# Patient Record
Sex: FEMALE | Race: WHITE | Employment: FULL TIME | URBAN - METROPOLITAN AREA
[De-identification: names, ages, dates, MRNs, and addresses within clinical notes are randomized per-mention and may not be internally consistent; named-entity substitution may affect disease eponyms.]

---

## 2018-05-29 LAB
ANTIBODY SCREEN, EXTERNAL: NORMAL
HBSAG, EXTERNAL: NONREACTIVE
HIV, EXTERNAL: NONREACTIVE
RUBELLA, EXTERNAL: 1.89
T. PALLIDUM, EXTERNAL: NORMAL
TYPE, ABO & RH, EXTERNAL: NORMAL

## 2018-11-29 LAB — GRBS, EXTERNAL: NORMAL

## 2018-12-05 ENCOUNTER — HOSPITAL ENCOUNTER (EMERGENCY)
Age: 33
Discharge: HOME | End: 2018-12-05
Attending: OBSTETRICS & GYNECOLOGY | Admitting: OBSTETRICS & GYNECOLOGY
Payer: COMMERCIAL

## 2018-12-05 VITALS
DIASTOLIC BLOOD PRESSURE: 65 MMHG | HEART RATE: 76 BPM | BODY MASS INDEX: 34.75 KG/M2 | OXYGEN SATURATION: 99 % | WEIGHT: 177 LBS | TEMPERATURE: 98.1 F | SYSTOLIC BLOOD PRESSURE: 111 MMHG | HEIGHT: 60 IN

## 2018-12-05 LAB
ALBUMIN SERPL-MCNC: 2.3 G/DL (ref 3.5–5)
ALBUMIN/GLOB SERPL: 0.6 {RATIO} (ref 1.1–2.2)
ALP SERPL-CCNC: 162 U/L (ref 45–117)
ALT SERPL-CCNC: 17 U/L (ref 12–78)
ANION GAP SERPL CALC-SCNC: 9 MMOL/L (ref 5–15)
AST SERPL-CCNC: 21 U/L (ref 15–37)
BILIRUB SERPL-MCNC: 0.9 MG/DL (ref 0.2–1)
BUN SERPL-MCNC: 4 MG/DL (ref 6–20)
BUN/CREAT SERPL: 6 (ref 12–20)
CALCIUM SERPL-MCNC: 7.8 MG/DL (ref 8.5–10.1)
CHLORIDE SERPL-SCNC: 108 MMOL/L (ref 97–108)
CO2 SERPL-SCNC: 22 MMOL/L (ref 21–32)
CREAT SERPL-MCNC: 0.62 MG/DL (ref 0.55–1.02)
CREAT UR-MCNC: 269 MG/DL
ERYTHROCYTE [DISTWIDTH] IN BLOOD BY AUTOMATED COUNT: 15 % (ref 11.5–14.5)
GLOBULIN SER CALC-MCNC: 3.6 G/DL (ref 2–4)
GLUCOSE SERPL-MCNC: 113 MG/DL (ref 65–100)
HCT VFR BLD AUTO: 33 % (ref 35–47)
HGB BLD-MCNC: 10.4 G/DL (ref 11.5–16)
MCH RBC QN AUTO: 26.7 PG (ref 26–34)
MCHC RBC AUTO-ENTMCNC: 31.5 G/DL (ref 30–36.5)
MCV RBC AUTO: 84.8 FL (ref 80–99)
NRBC # BLD: 0 K/UL (ref 0–0.01)
NRBC BLD-RTO: 0 PER 100 WBC
PLATELET # BLD AUTO: 208 K/UL (ref 150–400)
PMV BLD AUTO: 12.4 FL (ref 8.9–12.9)
POTASSIUM SERPL-SCNC: 3.3 MMOL/L (ref 3.5–5.1)
PROT SERPL-MCNC: 5.9 G/DL (ref 6.4–8.2)
PROT UR-MCNC: 51 MG/DL (ref 0–11.9)
PROT/CREAT UR-RTO: 0.2
RBC # BLD AUTO: 3.89 M/UL (ref 3.8–5.2)
SODIUM SERPL-SCNC: 139 MMOL/L (ref 136–145)
URATE SERPL-MCNC: 5.4 MG/DL (ref 2.6–6)
WBC # BLD AUTO: 13 K/UL (ref 3.6–11)

## 2018-12-05 PROCEDURE — 59025 FETAL NON-STRESS TEST: CPT

## 2018-12-05 PROCEDURE — 80053 COMPREHEN METABOLIC PANEL: CPT

## 2018-12-05 PROCEDURE — 99284 EMERGENCY DEPT VISIT MOD MDM: CPT

## 2018-12-05 PROCEDURE — 85027 COMPLETE CBC AUTOMATED: CPT

## 2018-12-05 PROCEDURE — 84550 ASSAY OF BLOOD/URIC ACID: CPT

## 2018-12-05 PROCEDURE — 36415 COLL VENOUS BLD VENIPUNCTURE: CPT

## 2018-12-05 PROCEDURE — 84156 ASSAY OF PROTEIN URINE: CPT

## 2018-12-05 RX ORDER — SODIUM CHLORIDE 0.9 % (FLUSH) 0.9 %
SYRINGE (ML) INJECTION
Status: DISCONTINUED
Start: 2018-12-05 | End: 2018-12-05 | Stop reason: HOSPADM

## 2018-12-06 NOTE — PROGRESS NOTES
Discharge instructions reviewed with pt. Will keep appointment as scheduled for tomorrow. Copy of instructions provided

## 2018-12-06 NOTE — H&P
Addendum: 
 
Plt 208 Cr 0.62 ALT 17 AST 21 Uric acid 5.4 Protein/creatinine ratio 0.2 Patient reassured. Reviewed labs with pt and she will f/u with Dr. Heraclio Mancilla tomorrow as scheduled. Labor precautions/fetal movement as well as pre-eclampsia precautions reviewed. All questions answered.  
 
Diana Leslie, DO

## 2018-12-06 NOTE — H&P
History & Physical 
 
Name: Eduardo Gutiérrez MRN: [id number]  SSN: [id number] YOB: 1985  Age: 34 y.o. Sex: female Subjective:  
 
Estimated Date of Delivery: 18 OB History  Para Term  AB Living 1 SAB TAB Ectopic Molar Multiple Live Births # Outcome Date GA Lbr Joshua/2nd Weight Sex Delivery Anes PTL Lv  
1 Current Ms. Johnathon Tomas is a 29yo [id number] who is being evaluated with pregnancy at 36w6d. Patient presents to L&D after calling her physician's office with an elevated bp which she took of 150/90. Patient has been having headaches for approximately one month and has been having scotomata for a few weeks as well. She has been having lower extremity swelling as well which does not seem to be a new issue for her. She has been reporting some carpal tunnel syndrome symptoms as well. She denies any ruq pain. She reports good fetal movement and denies leaking fluid or vaginal bleeding. She has not been drinking a lot of water. She has also been working a lot, working 2 jobs. She does have hypothyroidism and recently has been having issues with heartburn but denies any other significant issues with this pregnancy. She denies any chest pain or shortness of breath. Please see prenatal records for details. PObHx: EAB x 2 PGynHx: h/o abnormal pap smear  with normal f/u PMHx: hypothyroidism; PCOS 
PSHx: ultrasound guided cyst aspiration Meds: synthroid, PNV, zantac, tylenol All: NKDA SocHx: ; works as a dialysis technician No past medical history on file. No past surgical history on file. Social History Occupational History  Occupation: dialysis tech Tobacco Use  Smoking status: Never Smoker Substance and Sexual Activity  Alcohol use: No  
  Alcohol/week: 0.0 oz  Drug use: No  
 Sexual activity: Yes  
  Partners: Male Family History Problem Relation Age of Onset  Hypertension Mother No Known Allergies Prior to Admission medications Medication Sig Start Date End Date Taking? Authorizing Provider  
folic acid 198 mcg tablet Take 1 Tab by mouth daily. 6/2/16   Brittany Mac MD  
  
 
Review of Systems: A comprehensive review of systems was negative except for that written in the HPI. Objective:  
 
Vitals: 
Vitals:  
 12/05/18 1856 12/05/18 1915 12/05/18 1933 BP: 132/85 129/76 111/65 Pulse: 75 76 Temp: 98.1 °F (36.7 °C) SpO2: 99% 99% Weight: 80.3 kg (177 lb) Height: 5' (1.524 m) Physical Exam: 
Patient without distress. Heart: S1S2 present Lung: normal respiratory effort Abdomen: soft, gravid, nontender Cervical Exam: deferred Lower Extremities: no calf tenderness;1+DTR UE & LE 
Membranes:  Intact Fetal Heart Rate: Reactive; 120-130's moderate variability, +accels, no decels [place]: nonw Prenatal Labs:  
O positive Rubella Immune TPA negative HBsAg negative HIV nonreactive GC/chlamydia negative 
glucola 122 GBS negative Lab Results Component Value Date/Time  
 Rubella, External 1.89 05/29/2018 GrBStrep, External neg 11/29/2018 HBsAg, External nonreactive 05/29/2018 HIV, External nonreactive 05/29/2018 ABO,Rh o positive 05/29/2018 Assessment/Plan: Active Problems: * No active hospital problems. * 
  
 
32yo [id number] @ 36w6d here for bp evaluation -bp's have been normal on L&D 
-will check CBC, CMP, uric acid, protein/creatinine ratio 
-discussed wrist braces for carpal tunnel 
-encouraged pt to increase water intake as dehydration may be contributing to headaches 
-reactive FHT 
-all questions answered 
-will discuss plan when labs are back Signed By:  Hernandez Phelan DO   
 December 5, 2018

## 2018-12-06 NOTE — PROGRESS NOTES
Patient arrives ambulatory to L&D triage with  after being told to come in due to high BPs and swelling in legs by Dr. Yasmani Quick. Patient is  and has anemia. Patient voided and provided urine sample; blood drawn at this time. All samples sent to lab at this time. Patient on monitor for NST.

## 2018-12-06 NOTE — DISCHARGE INSTRUCTIONS
Weeks 34 to 36 of Your Pregnancy: Care Instructions  Your Care Instructions    By now, your baby and your belly have grown quite large. It is almost time to give birth. A full-term pregnancy can deliver between 37 and 42 weeks. Your baby's lungs are almost ready to breathe air. The bones in your baby's head are now firm enough to protect it, but soft enough to move down through the birth canal.  You may feel excited, happy, anxious, or scared. You may wonder how you will know if you are in labor or what to expect during labor. Try to be flexible in your expectations of the birth. Because each birth is different, there is no way to know exactly what childbirth will be like for you. This care sheet will help you know what to expect and how to prepare. This may make your childbirth easier. If you haven't already had the Tdap shot during this pregnancy, talk to your doctor about getting it. It will help protect your  against pertussis infection. In the 36th week, most women have a test for group B streptococcus (GBS). GBS is a common bacteria that can live in the vagina and rectum. It can make your baby sick after birth. If you test positive, you will get antibiotics during labor. The medicine will keep your baby from getting the bacteria. Follow-up care is a key part of your treatment and safety. Be sure to make and go to all appointments, and call your doctor if you are having problems. It's also a good idea to know your test results and keep a list of the medicines you take. How can you care for yourself at home? Learn about pain relief choices  · Pain is different for every woman. Talk with your doctor about your feelings about pain. · You can choose from several types of pain relief. These include medicine or breathing techniques, as well as comfort measures. You can use more than one option. · If you choose to have pain medicine during labor, talk to your doctor about your options.  Some medicines lower anxiety and help with some of the pain. Others make your lower body numb so that you won't feel pain. · Be sure to tell your doctor about your pain medicine choice before you start labor or very early in your labor. You may be able to change your mind as labor progresses. · Rarely, a woman is put to sleep by medicine given through a mask or an IV. Labor and delivery  · The first stage of labor has three parts: early, active, and transition. ? Most women have early labor at home. You can stay busy or rest, eat light snacks, drink clear fluids, and start counting contractions. ? When talking during a contraction gets hard, you may be moving to active labor. During active labor, you should head for the hospital if you are not there already. ? You are in active labor when contractions come every 3 to 4 minutes and last about 60 seconds. Your cervix is opening more rapidly. ? If your water breaks, contractions will come faster and stronger. ? During transition, your cervix is stretching, and contractions are coming more rapidly. ? You may want to push, but your cervix might not be ready. Your doctor will tell you when to push. · The second stage starts when your cervix is completely opened and you are ready to push. ? Contractions are very strong to push the baby down the birth canal.  ? You will feel the urge to push. You may feel like you need to have a bowel movement. ? You may be coached to push with contractions. These contractions will be very strong, but you will not have them as often. You can get a little rest between contractions. ? You may be emotional and irritable. You may not be aware of what is going on around you.  ? One last push, and your baby is born. · The third stage is when a few more contractions push out the placenta. This may take 30 minutes or less. · The fourth stage is the welcome recovery. You may feel overwhelmed with emotions and exhausted but alert.  This is a good time to start breastfeeding. Where can you learn more? Go to http://jadiel-patt.info/. Enter C597 in the search box to learn more about \"Weeks 34 to 36 of Your Pregnancy: Care Instructions. \"  Current as of: November 21, 2017  Content Version: 11.8  © 2403-7924 [employer]wise, [employer]. Care instructions adapted under license by [employer] (which disclaims liability or warranty for this information). If you have questions about a medical condition or this instruction, always ask your healthcare professional. [address] any warranty or liability for your use of this information. Keep scheduled appointment.     Drink lot of water

## 2018-12-16 ENCOUNTER — HOSPITAL ENCOUNTER (INPATIENT)
Age: 33
LOS: 2 days | Discharge: HOME | End: 2018-12-18
Attending: OBSTETRICS & GYNECOLOGY | Admitting: OBSTETRICS & GYNECOLOGY
Payer: COMMERCIAL

## 2018-12-16 DIAGNOSIS — Z34.90 PREGNANCY, UNSPECIFIED GESTATIONAL AGE: Primary | ICD-10-CM

## 2018-12-16 LAB
BASOPHILS # BLD: 0 K/UL (ref 0–0.1)
BASOPHILS NFR BLD: 0 % (ref 0–1)
DIFFERENTIAL METHOD BLD: ABNORMAL
EOSINOPHIL # BLD: 0 K/UL (ref 0–0.4)
EOSINOPHIL NFR BLD: 0 % (ref 0–7)
ERYTHROCYTE [DISTWIDTH] IN BLOOD BY AUTOMATED COUNT: 15.7 % (ref 11.5–14.5)
HCT VFR BLD AUTO: 36.4 % (ref 35–47)
HGB BLD-MCNC: 11.6 G/DL (ref 11.5–16)
IMM GRANULOCYTES # BLD: 0.1 K/UL (ref 0–0.04)
IMM GRANULOCYTES NFR BLD AUTO: 1 % (ref 0–0.5)
LYMPHOCYTES # BLD: 1.3 K/UL (ref 0.8–3.5)
LYMPHOCYTES NFR BLD: 8 % (ref 12–49)
MCH RBC QN AUTO: 26.7 PG (ref 26–34)
MCHC RBC AUTO-ENTMCNC: 31.9 G/DL (ref 30–36.5)
MCV RBC AUTO: 83.9 FL (ref 80–99)
MONOCYTES # BLD: 0.7 K/UL (ref 0–1)
MONOCYTES NFR BLD: 4 % (ref 5–13)
NEUTS SEG # BLD: 14.5 K/UL (ref 1.8–8)
NEUTS SEG NFR BLD: 87 % (ref 32–75)
NRBC # BLD: 0 K/UL (ref 0–0.01)
NRBC BLD-RTO: 0 PER 100 WBC
PLATELET # BLD AUTO: 295 K/UL (ref 150–400)
PMV BLD AUTO: 11.8 FL (ref 8.9–12.9)
RBC # BLD AUTO: 4.34 M/UL (ref 3.8–5.2)
WBC # BLD AUTO: 16.6 K/UL (ref 3.6–11)

## 2018-12-16 PROCEDURE — 65410000002 HC RM PRIVATE OB

## 2018-12-16 PROCEDURE — 75410000000 HC DELIVERY VAGINAL/SINGLE

## 2018-12-16 PROCEDURE — 85025 COMPLETE CBC W/AUTO DIFF WBC: CPT

## 2018-12-16 PROCEDURE — 75410000002 HC LABOR FEE PER 1 HR

## 2018-12-16 PROCEDURE — 74011250636 HC RX REV CODE- 250/636: Performed by: OBSTETRICS & GYNECOLOGY

## 2018-12-16 PROCEDURE — 36415 COLL VENOUS BLD VENIPUNCTURE: CPT

## 2018-12-16 PROCEDURE — 74011250637 HC RX REV CODE- 250/637: Performed by: OBSTETRICS & GYNECOLOGY

## 2018-12-16 PROCEDURE — 4A0HXCZ MEASUREMENT OF PRODUCTS OF CONCEPTION, CARDIAC RATE, EXTERNAL APPROACH: ICD-10-PCS | Performed by: OBSTETRICS & GYNECOLOGY

## 2018-12-16 PROCEDURE — 75410000003 HC RECOV DEL/VAG/CSECN EA 0.5 HR

## 2018-12-16 PROCEDURE — 0KQM0ZZ REPAIR PERINEUM MUSCLE, OPEN APPROACH: ICD-10-PCS | Performed by: OBSTETRICS & GYNECOLOGY

## 2018-12-16 PROCEDURE — 74011250636 HC RX REV CODE- 250/636

## 2018-12-16 RX ORDER — DIPHENHYDRAMINE HCL 25 MG
25 CAPSULE ORAL
Status: DISCONTINUED | OUTPATIENT
Start: 2018-12-16 | End: 2018-12-18 | Stop reason: HOSPADM

## 2018-12-16 RX ORDER — SODIUM CHLORIDE 0.9 % (FLUSH) 0.9 %
5-10 SYRINGE (ML) INJECTION EVERY 8 HOURS
Status: DISCONTINUED | OUTPATIENT
Start: 2018-12-16 | End: 2018-12-16

## 2018-12-16 RX ORDER — LIDOCAINE HYDROCHLORIDE 10 MG/ML
INJECTION, SOLUTION EPIDURAL; INFILTRATION; INTRACAUDAL; PERINEURAL
Status: DISPENSED
Start: 2018-12-16 | End: 2018-12-17

## 2018-12-16 RX ORDER — HYDROCORTISONE ACETATE PRAMOXINE HCL 2.5; 1 G/100G; G/100G
CREAM TOPICAL AS NEEDED
Status: DISCONTINUED | OUTPATIENT
Start: 2018-12-16 | End: 2018-12-18 | Stop reason: HOSPADM

## 2018-12-16 RX ORDER — OXYTOCIN 10 [USP'U]/ML
INJECTION, SOLUTION INTRAMUSCULAR; INTRAVENOUS
Status: COMPLETED
Start: 2018-12-16 | End: 2018-12-16

## 2018-12-16 RX ORDER — ZOLPIDEM TARTRATE 5 MG/1
5 TABLET ORAL
Status: DISCONTINUED | OUTPATIENT
Start: 2018-12-16 | End: 2018-12-18 | Stop reason: HOSPADM

## 2018-12-16 RX ORDER — OXYTOCIN/RINGER'S LACTATE 20/1000 ML
PLASTIC BAG, INJECTION (ML) INTRAVENOUS
Status: COMPLETED
Start: 2018-12-16 | End: 2018-12-16

## 2018-12-16 RX ORDER — DOCUSATE SODIUM 100 MG/1
100 CAPSULE, LIQUID FILLED ORAL
Status: DISCONTINUED | OUTPATIENT
Start: 2018-12-16 | End: 2018-12-18 | Stop reason: HOSPADM

## 2018-12-16 RX ORDER — IBUPROFEN 400 MG/1
800 TABLET ORAL
Status: DISCONTINUED | OUTPATIENT
Start: 2018-12-16 | End: 2018-12-18 | Stop reason: HOSPADM

## 2018-12-16 RX ORDER — NALOXONE HYDROCHLORIDE 0.4 MG/ML
0.4 INJECTION, SOLUTION INTRAMUSCULAR; INTRAVENOUS; SUBCUTANEOUS AS NEEDED
Status: DISCONTINUED | OUTPATIENT
Start: 2018-12-16 | End: 2018-12-16

## 2018-12-16 RX ORDER — LANOLIN ALCOHOL/MO/W.PET/CERES
325 CREAM (GRAM) TOPICAL
COMMUNITY
End: 2019-08-28 | Stop reason: ALTCHOICE

## 2018-12-16 RX ORDER — ACETAMINOPHEN 325 MG/1
650 TABLET ORAL
Status: DISCONTINUED | OUTPATIENT
Start: 2018-12-16 | End: 2018-12-18 | Stop reason: HOSPADM

## 2018-12-16 RX ORDER — LEVOTHYROXINE SODIUM 50 UG/1
75 TABLET ORAL
COMMUNITY
End: 2019-08-28 | Stop reason: DRUGHIGH

## 2018-12-16 RX ORDER — ONDANSETRON 2 MG/ML
4 INJECTION INTRAMUSCULAR; INTRAVENOUS
Status: DISCONTINUED | OUTPATIENT
Start: 2018-12-16 | End: 2018-12-16

## 2018-12-16 RX ORDER — SODIUM CHLORIDE 0.9 % (FLUSH) 0.9 %
5-10 SYRINGE (ML) INJECTION AS NEEDED
Status: DISCONTINUED | OUTPATIENT
Start: 2018-12-16 | End: 2018-12-16

## 2018-12-16 RX ORDER — OXYTOCIN/RINGER'S LACTATE 20/1000 ML
125-500 PLASTIC BAG, INJECTION (ML) INTRAVENOUS ONCE
Status: ACTIVE | OUTPATIENT
Start: 2018-12-16 | End: 2018-12-17

## 2018-12-16 RX ORDER — NALBUPHINE HYDROCHLORIDE 10 MG/ML
10 INJECTION, SOLUTION INTRAMUSCULAR; INTRAVENOUS; SUBCUTANEOUS
Status: DISCONTINUED | OUTPATIENT
Start: 2018-12-16 | End: 2018-12-16

## 2018-12-16 RX ORDER — ONDANSETRON 2 MG/ML
4 INJECTION INTRAMUSCULAR; INTRAVENOUS
Status: DISCONTINUED | OUTPATIENT
Start: 2018-12-16 | End: 2018-12-18 | Stop reason: HOSPADM

## 2018-12-16 RX ORDER — NALOXONE HYDROCHLORIDE 0.4 MG/ML
0.4 INJECTION, SOLUTION INTRAMUSCULAR; INTRAVENOUS; SUBCUTANEOUS AS NEEDED
Status: DISCONTINUED | OUTPATIENT
Start: 2018-12-16 | End: 2018-12-18 | Stop reason: HOSPADM

## 2018-12-16 RX ORDER — SODIUM CHLORIDE, SODIUM LACTATE, POTASSIUM CHLORIDE, CALCIUM CHLORIDE 600; 310; 30; 20 MG/100ML; MG/100ML; MG/100ML; MG/100ML
125 INJECTION, SOLUTION INTRAVENOUS CONTINUOUS
Status: DISCONTINUED | OUTPATIENT
Start: 2018-12-16 | End: 2018-12-16

## 2018-12-16 RX ADMIN — Medication 20000 MILLI-UNITS: at 15:30

## 2018-12-16 RX ADMIN — ONDANSETRON 4 MG: 2 INJECTION INTRAMUSCULAR; INTRAVENOUS at 12:22

## 2018-12-16 RX ADMIN — OXYTOCIN 10 UNITS: 10 INJECTION, SOLUTION INTRAMUSCULAR; INTRAVENOUS at 15:30

## 2018-12-16 RX ADMIN — SODIUM CHLORIDE, SODIUM LACTATE, POTASSIUM CHLORIDE, AND CALCIUM CHLORIDE 125 ML/HR: 600; 310; 30; 20 INJECTION, SOLUTION INTRAVENOUS at 12:08

## 2018-12-16 RX ADMIN — NALBUPHINE HYDROCHLORIDE 10 MG: 10 INJECTION, SOLUTION INTRAMUSCULAR; INTRAVENOUS; SUBCUTANEOUS at 12:22

## 2018-12-16 RX ADMIN — IBUPROFEN 800 MG: 400 TABLET ORAL at 20:48

## 2018-12-16 NOTE — PROGRESS NOTES
Labor Progress Note  Patient seen, fetal heart rate and contraction pattern evaluated, patient examined. Patient received nubain with some relief and now using nitrous.   Patient Vitals for the past 1 hrs:   BP Pulse SpO2   18 1413   94 %   18 1409   97 %   18 1408   92 %   18 1404   100 %   18 1402   94 %   18 1359   97 %   18 1355   93 %   18 1354   93 %   18 1350   (!) 88 %   18 1349   96 %   18 1344   (!) 86 %   18 1343 (!) 151/108 77 94 %   18 1339   (!) 80 %   18 1337   (!) 82 %   18 1334   (!) 78 %   18 1331   90 %   18 1329   (!) 86 %       Physical Exam:  Cervical Exam:  9/100/0  Uterine Activity: difficulty tracing  Fetal Heart Rate: Reactive; 110's moderate variability, +accels, no decels    Assessment/Plan:  34yo  @ 38w3d in labor  -cont exp mgmt  -reactive FHT

## 2018-12-16 NOTE — PROGRESS NOTES
36  arrived from home with c/o contractions and SROM. EFMx2 applied fetal heart tones found in left lower abdomen. [address] [facility] [address] Castillo Screen at bedside to assess pt.    1148 pt transferred from triage to labor room 3169.    1410 Nitrous per pt request for contraction pain 10/10.    1735 Pt transferred from recovery care to postpartum care. 193 Bedside report given to WILLIAM Hazel RN and care turned over.

## 2018-12-16 NOTE — PROCEDURES
Delivery Note    Obstetrician:  Mare Black DO    Assistant: none    Pre-Delivery Diagnosis: Term pregnancy    Post-Delivery Diagnosis: Living  male infant    Intrapartum Event: None    Procedure: Spontaneous vaginal delivery    Epidural: NO    Monitor:  Fetal Heart Tones - External and Uterine Contractions - External    Indications for instrumental delivery: none    Estimated Blood Loss: 300cc    Episiotomy: none    Laceration(s):  2nd degree    Laceration(s) repair: YES with 3-0 and 2-0 vicryl    Presentation: Cephalic    Fetal Description: weldon    Fetal Position: Right Occiput Anterior    Birth Weight: 7lbs 2oz    Birth Length: 21 inches    Apgar - One Minute: 9    Apgar - Five Minutes: 9    Umbilical Cord: Nuchal Cord x  1, loose & reduced at the perineum and true knot    Specimens: none           Complications:  none           Cord Blood Results:   Information for the patient's :  James Bryan [[id number]]   No results found for: PCTABR, PCTDIG, BILI, ABORH    Prenatal Labs:     Lab Results   Component Value Date/Time    HBsAg, External nonreactive 2018    HIV, External nonreactive 2018    Rubella, External 1.89 2018    GrBStrep, External neg 2018        Attending Attestation: I performed the procedure    Patient is a 34yo G3 now P1 who presented earlier today with complaints of contractions which started at 0700 and SROM which occurred around 0815. Patient was found to be 5cm upon arrival to L&D. She received nubain x 1 and then used nitrous. She then quickly progressed to fully dilated and pushed approx 20 minutes.  over intact perineum of viable male infant, YASH at 46; there was a nuchal x 1 which was loose and reduced at the perineum; left shoulder delivered anterior without difficulty as did the rest of the body. Baby was placed on mom's abdomen and delayed cord clamping was performed.   This provider was then called out of the room for a precipitous delivery in another room. When this provider returned, the cord had been clamped and cut by the RN and FOB and the baby was taken to the warmer. Placenta then delivered spontaneous and intact. After delivery there was a second degree laceration which was noted. The sphincter muscle was reinforced with one figure of eight stitch using 2-0 vicryl and then rest of the laceration was repaired with 3-0 and 2-0 vicryl in a routine fashion. A rectal exam was done at the end of the exam and found to be intact. Mom and baby doing well.       Signed By:  Rachael Mccarty DO     December 16, 2018

## 2018-12-16 NOTE — H&P
History & Physical    Name: Cuca Salomon MRN: [id number]  SSN: [id number]    YOB: 1985  Age: [age] [place] y.o. Sex: female        Subjective:     Estimated Date of Delivery: 18  OB History    Para Term  AB Living   1             SAB TAB Ectopic Molar Multiple Live Births                    # Outcome Date GA Lbr Joshua/2nd Weight Sex Delivery Anes PTL Lv   1 Current                   Ms. Volodymyr Leiva is admitted with pregnancy at 38w3d for active labor. Patient reports she woke up this morning and started having contractions around 0700 which have been getting more intense. She also reports leaking fluid at 0815 and wore depends in which she said were changed a couple of times at home. Patient was followed for increased swelling and occasional elevated bp's over the past couple of weeks. Fetus also had a choroid plexus cyst and pyelectasis, both of which resolved. Patient is hypothyroid. Patient plans natural childbirth. Please see prenatal records for details. Past Medical History:   Diagnosis Date    Thyroid activity decreased      History reviewed. No pertinent surgical history. Social History     Occupational History    Occupation: dialysis tech   Tobacco Use    Smoking status: Never Smoker    Smokeless tobacco: Never Used   Substance and Sexual Activity    Alcohol use: No     Alcohol/week: 0.0 oz    Drug use: No    Sexual activity: Yes     Partners: Male     Family History   Problem Relation Age of Onset    Hypertension Mother        No Known Allergies  Prior to Admission medications    Medication Sig Start Date End Date Taking? Authorizing Provider   levothyroxine (SYNTHROID) 50 mcg tablet Take 50 mcg by mouth Daily (before breakfast). Yes Provider, Historical   PNV#16-Iron Fum & PS-FA-OM-3 35-1-200 mg cap Take 1 Tab by mouth. Yes Provider, Historical   ferrous sulfate (IRON) 325 mg (65 mg iron) tablet Take 325 mg by mouth Daily (before breakfast).    Yes Provider, Historical        Review of Systems: A comprehensive review of systems was negative except for that written in the HPI. Objective:     Vitals:  Vitals:    12/16/18 1144 12/16/18 1145 12/16/18 1148 12/16/18 1152   BP:       Pulse:       Resp:       Temp:       SpO2: 99% 99% 99% 99%   Weight:       Height:            Physical Exam:  Patient without distress but uncomfortable with ctx. Heart: s1s2  Lung: normal respiratory effort  Abdomen: soft, gravid, nontender  Perineum: blood absent, thighs appear slightly wet  Cervical Exam: 5/100/0  Lower Extremities: no calf tenderness  Membranes:  Spontaneous Rupture of Membranes; Amniotic Fluid: clear fluid  Fetal Heart Rate: Reactive; 120's moderate variability, +accels, no decels    Prenatal Labs:   Lab Results   Component Value Date/Time    Rubella, External 1.89 05/29/2018    GrBStrep, External neg 11/29/2018    HBsAg, External nonreactive 05/29/2018    HIV, External nonreactive 05/29/2018    ABO,Rh o positive 05/29/2018         Assessment/Plan:     Active Problems:    * No active hospital problems.  *       27yo [id number] @ 38w3d in labor  -admit to L&D  -CBC  -clears if desired  -may ambulate if desired  -IV pain meds and/or epidural if desired  -GBS negative  -all questions answered  -reactive FHT  -exp mgmt    Signed By:  Rachael Mccarty DO     December 16, 2018

## 2018-12-17 PROCEDURE — 99283 EMERGENCY DEPT VISIT LOW MDM: CPT

## 2018-12-17 PROCEDURE — 65410000002 HC RM PRIVATE OB

## 2018-12-17 PROCEDURE — 74011250637 HC RX REV CODE- 250/637: Performed by: OBSTETRICS & GYNECOLOGY

## 2018-12-17 RX ORDER — LEVOTHYROXINE SODIUM 50 UG/1
50 TABLET ORAL
Status: DISCONTINUED | OUTPATIENT
Start: 2018-12-17 | End: 2018-12-18 | Stop reason: HOSPADM

## 2018-12-17 RX ADMIN — IBUPROFEN 800 MG: 400 TABLET ORAL at 04:52

## 2018-12-17 RX ADMIN — IBUPROFEN 800 MG: 400 TABLET ORAL at 12:44

## 2018-12-17 RX ADMIN — IBUPROFEN 800 MG: 400 TABLET ORAL at 20:51

## 2018-12-17 RX ADMIN — LEVOTHYROXINE SODIUM 50 MCG: 50 TABLET ORAL at 09:28

## 2018-12-17 NOTE — ROUTINE PROCESS
Shift change report given to STEPHEN ScottRN (oncoming nurse) by WILLIAM Wilson RNC-MNN (offgoing nurse). Report included the following information SBAR, Procedure Summary, Intake/Output, MAR and Recent Results.

## 2018-12-17 NOTE — ROUTINE PROCESS
In chart as Director to review triage documentation. Documented in Triage Acuity tool per RN and MD documentation.

## 2018-12-17 NOTE — PROGRESS NOTES
TRANSFER - IN REPORT:    Verbal report received from MARY Wallace(name) on Antonio Notice  being received from L&D(unit) for routine progression of care      Report consisted of patients Situation, Background, Assessment and   Recommendations(SBAR). Information from the following report(s) SBAR, Procedure Summary, Intake/Output, MAR and Recent Results was reviewed with the receiving nurse. Opportunity for questions and clarification was provided. Assessment completed upon patients arrival to unit and care assumed.

## 2018-12-17 NOTE — PROGRESS NOTES
Post-Partum Day Number 1 Progress Note    Tracie Mejia     Assessment: Doing well, post partum day 1    Plan:  1. Continue routine postpartum and perineal care as well as maternal education. 2. The risks and benefits of the circumcision  procedure and anesthesia including: bleeding, infection, variability of cosmetic results were discussed at length with the mother. She is aware that future repeat procedures may be necessary. She gives informed consent to proceed as noted and her questions are answered. Information for the patient's :  Jerica England [[id number]]   Vaginal, Spontaneous   Patient doing well without significant complaint. Voiding without difficulty, normal lochia. Vitals:  Visit Vitals  /68 (BP 1 Location: Right arm, BP Patient Position: At rest)   Pulse 73   Temp 97.7 °F (36.5 °C)   Resp 17   Ht 5' (1.524 m)   Wt 85.3 kg (188 lb)       Breastfeeding? Unknown   BMI 36.72 kg/m²     Temp (24hrs), Av.2 °F (36.8 °C), Min:97.7 °F (36.5 °C), Max:99.1 °F (37.3 °C)        Exam:   Patient without distress. Abdomen soft, fundus firm, nontender                Perineum with normal lochia noted. Lower extremities are negative for swelling, cords or tenderness.     Labs:     Lab Results   Component Value Date/Time    WBC 16.6 (H) 2018 12:15 PM    WBC 13.0 (H) 2018 07:00 PM    HGB 11.6 2018 12:15 PM    HGB 10.4 (L) 2018 07:00 PM    HCT 36.4 2018 12:15 PM    HCT 33.0 (L) 2018 07:00 PM    PLATELET 024  12:15 PM    PLATELET 324  07:00 PM       Recent Results (from the past 24 hour(s))   CBC WITH AUTOMATED DIFF    Collection Time: 18 12:15 PM   Result Value Ref Range    WBC 16.6 (H) 3.6 - 11.0 K/uL    RBC 4.34 3.80 - 5.20 M/uL    HGB 11.6 11.5 - 16.0 g/dL    HCT 36.4 35.0 - 47.0 %    MCV 83.9 80.0 - 99.0 FL    MCH 26.7 26.0 - 34.0 PG    MCHC 31.9 30.0 - 36.5 g/dL    RDW 15.7 (H) 11.5 - 14.5 % PLATELET 925 503 - 192 K/uL    MPV 11.8 8.9 - 12.9 FL    NRBC 0.0 0  WBC    ABSOLUTE NRBC 0.00 0.00 - 0.01 K/uL    NEUTROPHILS 87 (H) 32 - 75 %    LYMPHOCYTES 8 (L) 12 - 49 %    MONOCYTES 4 (L) 5 - 13 %    EOSINOPHILS 0 0 - 7 %    BASOPHILS 0 0 - 1 %    IMMATURE GRANULOCYTES 1 (H) 0.0 - 0.5 %    ABS. NEUTROPHILS 14.5 (H) 1.8 - 8.0 K/UL    ABS. LYMPHOCYTES 1.3 0.8 - 3.5 K/UL    ABS. MONOCYTES 0.7 0.0 - 1.0 K/UL    ABS. EOSINOPHILS 0.0 0.0 - 0.4 K/UL    ABS. BASOPHILS 0.0 0.0 - 0.1 K/UL    ABS. IMM.  GRANS. 0.1 (H) 0.00 - 0.04 K/UL    DF AUTOMATED

## 2018-12-17 NOTE — PROGRESS NOTES
Up with nurse at side to ambulate to commode. Voided 200cc without difficulty. Oriented to self filomena care. Gait even and steady. Denies light headedness and dizziness. To shower. arely well. 2145: moved to room 3310 via w/ch with all personal belongings, infant, and significant other. No c/o. Oriented to room, call system, tv control, telephone. Questions answered. arely well. 0010: up with nurse at side to ambulate to commode and void 400cc without difficulty. Ice pack replaced. Gait even and steady. Denies light headedness and dizziness. Returned to bed without difficulty. Informed may get OOB without nurse present and to notify if requires assist. Paul Cousin understanding and willingness.

## 2018-12-18 VITALS
HEART RATE: 67 BPM | BODY MASS INDEX: 36.91 KG/M2 | SYSTOLIC BLOOD PRESSURE: 131 MMHG | TEMPERATURE: 97.8 F | HEIGHT: 60 IN | OXYGEN SATURATION: 100 % | WEIGHT: 188 LBS | RESPIRATION RATE: 16 BRPM | DIASTOLIC BLOOD PRESSURE: 81 MMHG

## 2018-12-18 PROCEDURE — 74011250637 HC RX REV CODE- 250/637: Performed by: OBSTETRICS & GYNECOLOGY

## 2018-12-18 RX ORDER — IBUPROFEN 800 MG/1
800 TABLET ORAL
Qty: 30 TAB | Refills: 1 | Status: SHIPPED | OUTPATIENT
Start: 2018-12-18 | End: 2019-08-28 | Stop reason: ALTCHOICE

## 2018-12-18 RX ORDER — OXYCODONE AND ACETAMINOPHEN 5; 325 MG/1; MG/1
1 TABLET ORAL
Qty: 15 TAB | Refills: 0 | Status: SHIPPED | OUTPATIENT
Start: 2018-12-18 | End: 2019-08-28 | Stop reason: ALTCHOICE

## 2018-12-18 RX ADMIN — LEVOTHYROXINE SODIUM 50 MCG: 50 TABLET ORAL at 07:06

## 2018-12-18 RX ADMIN — IBUPROFEN 800 MG: 400 TABLET ORAL at 07:06

## 2018-12-18 NOTE — DISCHARGE INSTRUCTIONS
POSTPARTUM DISCHARGE INSTRUCTIONS       Name:  Armando Camacho  YOB: 1985  Admission Diagnosis:  Contractions     Discharge Diagnosis:    Problem List as of 12/18/2018 Date Reviewed: 6/4/2016          Codes Class Noted - Resolved    Pregnancy ICD-10-CM: Z34.90  ICD-9-CM: V22.2  12/16/2018 - Present            Attending Physician: Wilfrid Erickson, *    Delivery Type:  Vaginal Childbirth: What To Expect At Home    Your Recovery: Your body will slowly heal in the next few weeks. It is easy to get too tired and overwhelmed during the first weeks after your baby is born. Changes in your hormones can shift your mood without warning. You may find it hard to meet the extra demands on your energy and time. Take it easy on yourself. Follow-up care is a key part of your treatment and safety. Be sure to make and go to all appointments, and call your doctor if you are having problems. It's also a good idea to know your test results and keep a list of the medicines you take. How can you care for yourself at home? Vaginal bleeding and cramps  · After delivery, you will have a bloody discharge from the vagina. This will turn pink within a week and then white or yellow after about 10 days. It may last for 2 to 4 weeks or longer, until the uterus has healed. Use pads instead of tampons until you stop bleeding. · Do not worry if you pass some blood clots, as long as they are smaller than a golf ball. If you have a tear or stitches in your vaginal area, change the pad at least every 4 hours to prevent soreness and infection. · You may have cramps for the first few days after childbirth. These are normal and occur as the uterus shrinks to normal size. Take an over-the-counter pain medicine, such as acetaminophen (Tylenol), ibuprofen (Advil, Motrin), or naproxen (Aleve), for cramps. Read and follow all instructions on the label. Do not take aspirin, because it can cause more bleeding.   Do not take acetaminophen (Tylenol) and other acetaminophen containing medications (i.e. Percocet) at the same time. Breast fullness  · Your breasts may overfill (engorge) in the first few days after delivery. To help milk flow and to relieve pain, warm your breasts in the shower or by using warm, moist towels before nursing. · If you are not nursing, do not put warmth on your breasts or touch your breasts. Wear a tight bra or sports bra and use ice until the fullness goes away. This usually takes 2 to 3 days. · Put ice or a cold pack on your breast after nursing to reduce swelling and pain. Put a thin cloth between the ice and your skin. Activity  · Eat a balanced diet. Do not try to lose weight by cutting calories. Keep taking your prenatal vitamins, or take a multivitamin. · Get as much rest as you can. Try to take naps when your baby sleeps during the day. · Get some exercise every day. But do not do any heavy exercise until your doctor says it is okay. · Wait until you are healed (about 4 to 6 weeks) before you have sexual intercourse. Your doctor will tell you when it is okay to have sex. · Talk to your doctor about birth control. You can get pregnant even before your period returns. Also, you can get pregnant while you are breast-feeding. Mental Health  · Many women get the \"baby blues\" during the first few days after childbirth. You may lose sleep, feel irritable, and cry easily. You may feel happy one minute and sad the next. Hormone changes are one cause of these emotional changes. Also, the demands of a new baby, along with visits from relatives or other family needs, add to a mother's stress. The \"baby blues\" often peak around the fourth day. Then they ease up in less than 2 weeks. · If your moodiness or anxiety lasts for more than 2 weeks, or if you feel like life is not worth living, you may have postpartum depression. This is different for each mother.  Some mothers with serious depression may worry intensely about their infant's well-being. Others may feel distant from their child. Some mothers might even feel that they might harm their baby. A mother may have signs of paranoia, wondering if someone is watching her. · With all the changes in your life, you may not know if you are depressed. Pregnancy sometimes causes changes in how you feel that are similar to the symptoms of depression. · Symptoms of depression include:  · Feeling sad or hopeless and losing interest in daily activities. These are the most common symptoms of depression. · Sleeping too much or not enough. · Feeling tired. You may feel as if you have no energy. · Eating too much or too little. · POSTPARTUM SUPPORT INTERNATIONAL (PSI) offers a Warm line; Chat with the Expert phone sessions; Information and Articles about Pregnancy and Postpartum Mood Disorders; Comprehensive List of Free Support Groups; Knowledgeable local coordinators who will offer support, information, and resources; Guide to Resources on [place]; Calendar of events in the  mood disorders community; Latest News and Research; and [facility] [address] for [employer]. Remember - You are not alone; You are not to blame; With help, you will be well. 9-908-139-PPD(3649). WWW. POSTPARTUM. NET   · Writing or talking about death, such as writing suicide notes or talking about guns, knives, or pills. Keep the numbers for these national suicide hotlines: 9-056-995-TALK (1-254.653.6741) and 1-434-FPDRLJQ (8-536.957.3072). If you or someone you know talks about suicide or feeling hopeless, get help right away. Constipation and Hemorrhoids  · Drink plenty of fluids, enough so that your urine is light yellow or clear like water. If you have kidney, heart, or liver disease and have to limit fluids, talk with your doctor before you increase the amount of fluids you drink. · Eat plenty of fiber each day.  Have a bran muffin or bran cereal for breakfast, and try eating a piece of fruit for a mid-afternoon snack. · For painful, itchy hemorrhoids, put ice or a cold pack on the area several times a day for 10 minutes at a time. Follow this by putting a warm compress on the area for another 10 to 20 minutes or by sitting in a shallow, warm bath. When should you call for help? Call 911 anytime you think you may need emergency care. For example, call if:  · You are thinking of hurting yourself, your baby, or anyone else. · You passed out (lost consciousness). · You have symptoms of a blood clot in your lung (called a pulmonary embolism). These may include:    · Sudden chest pain. · Trouble breathing. · Coughing up blood. Call your doctor now or seek immediate medical care if:  · You have severe vaginal bleeding. · You are soaking through a pad each hour for 2 or more hours. · Your vaginal bleeding seems to be getting heavier or is still bright red 4 days after delivery. · You are dizzy or lightheaded, or you feel like you may faint. · You are vomiting or cannot keep fluids down. · You have a fever. · You have new or more belly pain. · You pass tissue (not just blood). · Your vaginal discharge smells bad. · Your belly feels tender or full and hard. · Your breasts are continuously painful or red. · You feel sad, anxious, or hopeless for more than a few days. · You have sudden, severe pain in your belly. · You have symptoms of a blood clot in your leg (called a deep vein thrombosis),          such as:  · Pain in your calf, back of the knee, thigh, or groin. · Redness and swelling in your leg or groin. · You have symptoms of preeclampsia, such as:  · Sudden swelling of your face, hands, or feet. · New vision problems (such as dimness or blurring). · A severe headache. · Your blood pressure is higher than it should be or rises suddenly. · You have new nausea or vomiting.     Watch closely for changes in your health, and be sure to contact your doctor if you have any problems. Additional Information:  {Postpartum Pregnancy Complications:27472}    These are general instructions for a healthy lifestyle:    No smoking/ No tobacco products/ Avoid exposure to second hand smoke    Surgeon General's Warning:  Quitting smoking now greatly reduces serious risk to your health. Obesity, smoking, and sedentary lifestyle greatly increases your risk for illness    A healthy diet, regular physical exercise & weight monitoring are important for maintaining a healthy lifestyle    Recognize signs and symptoms of STROKE:    F-face looks uneven    A-arms unable to move or move unevenly    S-speech slurred or non-existent    T-time-call 911 as soon as signs and symptoms begin - DO NOT go       back to bed or wait to see if you get better - TIME IS BRAIN. I have had the opportunity to make my options or choices for discharge. I have received and understand these instructions.

## 2018-12-18 NOTE — ROUTINE PROCESS
Bedside and Verbal shift change report given to WILLIAM Johnson (oncoming nurse) by Silas Danielle. Michelle Butler RN (offgoing nurse). Report included the following information SBAR.

## 2018-12-18 NOTE — PROGRESS NOTES
Post-Partum Day Number 2 Progress Note    Cinthia Jordon     Assessment: Doing well, post partum day 2    Plan:   1. Discharge home today  2. Follow up in office in 6 weeks with Isabela Erickson, *  3. Post partum activity advised, diet as tolerated  4. Discharge Medications: ibuprofen, percocet and medications prior to admission    Information for the patient's :  Lorenzo Dodson [[id number]]   Vaginal, Spontaneous   Patient doing well without significant complaint. Voiding without difficulty, normal lochia. Vitals:  Visit Vitals  /81 (BP 1 Location: Right arm, BP Patient Position: At rest)   Pulse 67   Temp 97.8 °F (36.6 °C)   Resp 16   Ht 5' (1.524 m)   Wt 85.3 kg (188 lb)   SpO2 100%   Breastfeeding? Unknown   BMI 36.72 kg/m²     Temp (24hrs), Av °F (36.7 °C), Min:97.8 °F (36.6 °C), Max:98.2 °F (36.8 °C)      Exam:         Patient without distress. Abdomen soft, fundus firm, nontender                 Lower extremities are negative for swelling, cords or tenderness. Labs:     Lab Results   Component Value Date/Time    WBC 16.6 (H) 2018 12:15 PM    WBC 13.0 (H) 2018 07:00 PM    HGB 11.6 2018 12:15 PM    HGB 10.4 (L) 2018 07:00 PM    HCT 36.4 2018 12:15 PM    HCT 33.0 (L) 2018 07:00 PM    PLATELET 595  12:15 PM    PLATELET 754  07:00 PM       No results found for this or any previous visit (from the past 24 hour(s)).

## 2018-12-18 NOTE — ROUTINE PROCESS
1910 Bedside shift change report given to WHITNEY Saucedo RN (oncoming nurse) by Casey Franks. Mansi Brown [facility] (offgoing nurse). Report included the following information SBAR, Kardex, Intake/Output and MAR.

## 2018-12-18 NOTE — DISCHARGE SUMMARY
Obstetrical Discharge Summary     Name: Damon Boyce MRN: [id number]  SSN: [id number]    YOB: 1985  Age: 34 y.o. Sex: female      Admit Date: 2018    Discharge Date: 2018     Admitting Physician: Barry Roman DO     Attending Physician: Johanna Erickson, *     Admission Diagnoses: Contractions    Discharge Diagnoses:   Information for the patient's :  Claudean Main [[id number]]   Delivery of a 3.235 kg male infant via Vaginal, Spontaneous on 2018 at 3:06 PM  by . Apgars were 9 and 9. Additional Diagnoses:   Hospital Problems  Date Reviewed: 2016          Codes Class Noted POA    Pregnancy ICD-10-CM: Z34.90  ICD-9-CM: V22.2  2018 Unknown             Lab Results   Component Value Date/Time    Rubella, External 1.89 2018    GrBStrep, External neg 2018       Hospital Course: Normal hospital course following the delivery. Disposition at Discharge: Home or self care    Discharged Condition: Stable    Patient Instructions:   Current Discharge Medication List      START taking these medications    Details   ibuprofen (MOTRIN) 800 mg tablet Take 1 Tab by mouth every eight (8) hours as needed for Pain. Qty: 30 Tab, Refills: 1      oxyCODONE-acetaminophen (PERCOCET) 5-325 mg per tablet Take 1 Tab by mouth every six (6) hours as needed for Pain. Max Daily Amount: 4 Tabs. Qty: 15 Tab, Refills: 0    Associated Diagnoses: Pregnancy, unspecified gestational age         [de-identified] these medications which have NOT CHANGED    Details   levothyroxine (SYNTHROID) 50 mcg tablet Take 50 mcg by mouth Daily (before breakfast). PNV#16-Iron Fum & PS-FA-OM-3 35-1-200 mg cap Take 1 Tab by mouth. ferrous sulfate (IRON) 325 mg (65 mg iron) tablet Take 325 mg by mouth Daily (before breakfast). Reference my discharge instructions.     Follow-up Appointments   Procedures    FOLLOW UP VISIT Appointment in: 6 Weeks     Standing Status: Standing     Number of Occurrences:   1     Order Specific Question:   Appointment in     Answer:   6 Weeks        Signed By:  Kori Rust MD     December 18, 2018

## 2019-08-28 ENCOUNTER — OFFICE VISIT (OUTPATIENT)
Dept: INTERNAL MEDICINE CLINIC | Age: 34
End: 2019-08-28

## 2019-08-28 VITALS
WEIGHT: 142 LBS | HEIGHT: 60 IN | OXYGEN SATURATION: 99 % | DIASTOLIC BLOOD PRESSURE: 78 MMHG | TEMPERATURE: 99 F | SYSTOLIC BLOOD PRESSURE: 112 MMHG | HEART RATE: 62 BPM | RESPIRATION RATE: 14 BRPM | BODY MASS INDEX: 27.88 KG/M2

## 2019-08-28 DIAGNOSIS — S33.5XXS SPRAIN OF LOW BACK, SEQUELA: ICD-10-CM

## 2019-08-28 DIAGNOSIS — S06.0X0D CONCUSSION WITHOUT LOSS OF CONSCIOUSNESS, SUBSEQUENT ENCOUNTER: ICD-10-CM

## 2019-08-28 DIAGNOSIS — E03.9 HYPOTHYROIDISM, UNSPECIFIED TYPE: ICD-10-CM

## 2019-08-28 DIAGNOSIS — S13.9XXD NECK SPRAIN, SUBSEQUENT ENCOUNTER: Primary | ICD-10-CM

## 2019-08-28 RX ORDER — LEVOTHYROXINE SODIUM 75 UG/1
TABLET ORAL
COMMUNITY

## 2019-08-28 RX ORDER — CYCLOBENZAPRINE HCL 5 MG
5 TABLET ORAL
Qty: 60 TAB | Refills: 0 | Status: SHIPPED | OUTPATIENT
Start: 2019-08-28

## 2019-08-28 RX ORDER — TIZANIDINE HYDROCHLORIDE 4 MG/1
CAPSULE, GELATIN COATED ORAL
Refills: 0 | COMMUNITY
Start: 2019-08-11 | End: 2019-08-28 | Stop reason: ALTCHOICE

## 2019-08-28 RX ORDER — DICLOFENAC SODIUM 50 MG/1
50 TABLET, DELAYED RELEASE ORAL 2 TIMES DAILY
Qty: 60 TAB | Refills: 1 | Status: SHIPPED | OUTPATIENT
Start: 2019-08-28

## 2019-08-28 RX ORDER — ACETAMINOPHEN AND CODEINE PHOSPHATE 300; 30 MG/1; MG/1
1 TABLET ORAL
COMMUNITY
Start: 2019-08-11 | End: 2019-08-28 | Stop reason: ALTCHOICE

## 2019-08-28 NOTE — LETTER
NOTIFICATION OF RETURN TO WORK  
 
8/28/2019 2:42 PM 
 
Ms. Marlys Antonio [address] [facility] [address] Yelitza Suggs To Whom It May Concern: 
 
Marlys Antonio was under the care of [address]. She will be able to return to work on September 3, 2019. If there are questions or concerns please have the patient contact our office. Sincerely, Best Mckeon MD
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
## 2019-08-30 PROBLEM — E03.9 HYPOTHYROID: Status: ACTIVE | Noted: 2019-08-30

## 2019-08-30 PROBLEM — Z34.90 PREGNANCY: Status: RESOLVED | Noted: 2018-12-16 | Resolved: 2019-08-30

## 2022-03-18 PROBLEM — E03.9 HYPOTHYROID: Status: ACTIVE | Noted: 2019-08-30

## 2024-05-13 ENCOUNTER — APPOINTMENT (OUTPATIENT)
Facility: HOSPITAL | Age: 39
End: 2024-05-13

## 2024-05-13 ENCOUNTER — HOSPITAL ENCOUNTER (EMERGENCY)
Facility: HOSPITAL | Age: 39
Discharge: HOME | End: 2024-05-13
Attending: STUDENT IN AN ORGANIZED HEALTH CARE EDUCATION/TRAINING PROGRAM

## 2024-05-13 VITALS
BODY MASS INDEX: 39.34 KG/M2 | HEART RATE: 70 BPM | HEIGHT: 60 IN | OXYGEN SATURATION: 98 % | SYSTOLIC BLOOD PRESSURE: 111 MMHG | RESPIRATION RATE: 25 BRPM | DIASTOLIC BLOOD PRESSURE: 70 MMHG | WEIGHT: 200.4 LBS | TEMPERATURE: 98.2 F

## 2024-05-13 DIAGNOSIS — R55 SYNCOPE AND COLLAPSE: Primary | ICD-10-CM

## 2024-05-13 LAB
ALBUMIN SERPL-MCNC: 3.4 G/DL (ref 3.5–5)
ALBUMIN/GLOB SERPL: 1 (ref 1.1–2.2)
ALP SERPL-CCNC: 98 U/L (ref 45–117)
ALT SERPL-CCNC: 16 U/L (ref 12–78)
ANION GAP SERPL CALC-SCNC: 4 MMOL/L (ref 5–15)
AST SERPL-CCNC: 7 U/L (ref 15–37)
BASOPHILS # BLD: 0 K/UL (ref 0–0.1)
BASOPHILS NFR BLD: 0 % (ref 0–1)
BILIRUB SERPL-MCNC: 0.6 MG/DL (ref 0.2–1)
BUN/CREAT SERPL: 17 (ref 12–20)
CALCIUM SERPL-MCNC: 8.6 MG/DL (ref 8.5–10.1)
CHLORIDE SERPL-SCNC: 110 MMOL/L (ref 97–108)
CO2 SERPL-SCNC: 24 MMOL/L (ref 21–32)
CREAT SERPL-MCNC: 0.7 MG/DL (ref 0.55–1.02)
DIFFERENTIAL METHOD BLD: ABNORMAL
EOSINOPHIL NFR BLD: 1 % (ref 0–7)
ERYTHROCYTE [DISTWIDTH] IN BLOOD BY AUTOMATED COUNT: 20.3 % (ref 11.5–14.5)
GLOBULIN SER CALC-MCNC: 3.3 G/DL (ref 2–4)
GLUCOSE SERPL-MCNC: 94 MG/DL (ref 65–100)
HCT VFR BLD AUTO: 32.8 % (ref 35–47)
HGB BLD-MCNC: 9.8 G/DL (ref 11.5–16)
IMM GRANULOCYTES # BLD AUTO: 0.1 K/UL (ref 0–0.04)
IMM GRANULOCYTES NFR BLD AUTO: 1 % (ref 0–0.5)
LYMPHOCYTES # BLD: 1.4 K/UL (ref 0.8–3.5)
LYMPHOCYTES NFR BLD: 13 % (ref 12–49)
MCH RBC QN AUTO: 20.6 PG (ref 26–34)
MCHC RBC AUTO-ENTMCNC: 29.9 G/DL (ref 30–36.5)
MCV RBC AUTO: 69.1 FL (ref 80–99)
MONOCYTES # BLD: 0.5 K/UL (ref 0–1)
MONOCYTES NFR BLD: 5 % (ref 5–13)
NEUTS SEG # BLD: 8.8 K/UL (ref 1.8–8)
NRBC # BLD: 0 K/UL (ref 0–0.01)
NRBC BLD-RTO: 0 PER 100 WBC
PLATELET # BLD AUTO: 300 K/UL (ref 150–400)
PLATELET COMMENT: ABNORMAL
PMV BLD AUTO: 10.4 FL (ref 8.9–12.9)
POTASSIUM SERPL-SCNC: 3.8 MMOL/L (ref 3.5–5.1)
PROT SERPL-MCNC: 6.7 G/DL (ref 6.4–8.2)
RBC # BLD AUTO: 4.75 M/UL (ref 3.8–5.2)
RBC MORPH BLD: ABNORMAL
SODIUM SERPL-SCNC: 138 MMOL/L (ref 136–145)
TROPONIN I SERPL HS-MCNC: <4 NG/L (ref 0–51)
WBC # BLD AUTO: 10.9 K/UL (ref 3.6–11)

## 2024-05-13 PROCEDURE — 36415 COLL VENOUS BLD VENIPUNCTURE: CPT

## 2024-05-13 PROCEDURE — 80053 COMPREHEN METABOLIC PANEL: CPT

## 2024-05-13 PROCEDURE — 2580000003 HC RX 258: Performed by: STUDENT IN AN ORGANIZED HEALTH CARE EDUCATION/TRAINING PROGRAM

## 2024-05-13 PROCEDURE — 99285 EMERGENCY DEPT VISIT HI MDM: CPT

## 2024-05-13 PROCEDURE — 85025 COMPLETE CBC W/AUTO DIFF WBC: CPT

## 2024-05-13 PROCEDURE — 71045 X-RAY EXAM CHEST 1 VIEW: CPT

## 2024-05-13 PROCEDURE — 84484 ASSAY OF TROPONIN QUANT: CPT

## 2024-05-13 PROCEDURE — 93005 ELECTROCARDIOGRAM TRACING: CPT | Performed by: STUDENT IN AN ORGANIZED HEALTH CARE EDUCATION/TRAINING PROGRAM

## 2024-05-13 RX ORDER — 0.9 % SODIUM CHLORIDE 0.9 %
1000 INTRAVENOUS SOLUTION INTRAVENOUS ONCE
Status: COMPLETED | OUTPATIENT
Start: 2024-05-13 | End: 2024-05-13

## 2024-05-13 RX ADMIN — SODIUM CHLORIDE 1000 ML: 9 INJECTION, SOLUTION INTRAVENOUS at 16:57

## 2024-05-13 RX ADMIN — SODIUM CHLORIDE 1000 ML: 9 INJECTION, SOLUTION INTRAVENOUS at 17:45

## 2024-05-13 NOTE — DISCHARGE INSTRUCTIONS
Local Primary Care Physicians  [facility] [phone]  FILI Randle, MD Rasheed De Dios, MD Saturnino Robison, MD Genevieve Avila, [facility]  Maura Turcios, [facility]  Bhavna Escoto, [facility] [place]/Cassie [facility] [phone]  Joseph Butler, MD Mera Mistry, NP  Gloria Bobby, [facility]  Janae Jackson, [facility] [phone]  Meenu Jarrell, MD Saul Jarrell, MD Hanna Bates, MD Nae Hernandez, [facility] [phone]  MD Susy Tobias, MD Josefa Torres, MD Bao Xie, MD Saturnino Diaz, MD Vicky Coreas, [facility] [phone]  Gilmar Solorio, MD Saturnino Mo, NP  Jossie Swain, NP [facility] [phone]  Jesse \"Raul\" Darin, MD John Grimm, MD Evans Preciado, CNP  Portillo Branden, CNP  Soraya Gayle, PA-C   [facility] [phone]  MD Talib Munguia, MD Leatha Leyva, CNP  Neha Harlan, [facility] [phone]  Lauren Abdi, MD Los Mandujano, MD Brea Bah, MD Jarad Mesa MD Karlie Kellstrom PA-C   [facility] [phone]  MD Jenni Castro, MD Leatha Awad MD Gregory Stephens, MD Carol Garcia, [facility]  Lillie Medina, [facility]  Ayesha Carranza, [facility]  David Sosa, [facility] [phone]  MD Tom Garcia, MD Jet Sood, MD Hakan Oconnor, MD  Yokasta BeeMD Nicole campos MD Jason Lee, MD Jennifer Smith, MD [facility] [phone]  MD Amberly Moulton, CNP  Aria Renee, CNP  Earnestine Ortiz, [facility] [phone]  MD Raul Jon MD Veronika Leonenko, PA-C  Wendi Fields PA-C   [facility] [phone]  [facility], [facility].  [place]

## 2024-05-13 NOTE — ED NOTES
Assumed care of patient at this time. Pt changed into gown, placed on monitor x3, bed rail raised, and call bell within reach.
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
## 2024-05-13 NOTE — ED NOTES
Report received from EUGENIE Joseph. Reviewed reason for patient arrival, vitals, labs, medications, orders, procedures, results, pending orders/results and current plan for disposition. Questions were asked and answered prior to departure.

## 2024-05-13 NOTE — ED PROVIDER NOTES
MCV 69.1 (L) 80.0 - 99.0 FL    MCH 20.6 (L) 26.0 - 34.0 PG    MCHC 29.9 (L) 30.0 - 36.5 g/dL    RDW 20.3 (H) 11.5 - 14.5 %    Platelets 300 150 - 400 K/uL    MPV 10.4 8.9 - 12.9 FL    Nucleated RBCs 0.0 0  WBC    nRBC 0.00 0.00 - 0.01 K/uL    Neutrophils % 80 (H) 32 - 75 %    Lymphocytes % 13 12 - 49 %    Monocytes % 5 5 - 13 %    Eosinophils % 1 0 - 7 %    Basophils % 0 0 - 1 %    Immature Granulocytes % 1 (H) 0.0 - 0.5 %    Neutrophils Absolute 8.8 (H) 1.8 - 8.0 K/UL    Lymphocytes Absolute 1.4 0.8 - 3.5 K/UL    Monocytes Absolute 0.5 0.0 - 1.0 K/UL    Eosinophils Absolute 0.1 0.0 - 0.4 K/UL    Basophils Absolute 0.0 0.0 - 0.1 K/UL    Immature Granulocytes Absolute 0.1 (H) 0.00 - 0.04 K/UL    Differential Type SMEAR SCANNED      Platelet Comment CLUMPED PLATELETS      RBC Comment ANISOCYTOSIS  1+       Comprehensive Metabolic Panel    Collection Time: 05/13/24  4:10 PM   Result Value Ref Range    Sodium 138 136 - 145 mmol/L    Potassium 3.8 3.5 - 5.1 mmol/L    Chloride 110 (H) 97 - 108 mmol/L    CO2 24 21 - 32 mmol/L    Anion Gap 4 (L) 5 - 15 mmol/L    Glucose 94 65 - 100 mg/dL    BUN 12 6 - 20 MG/DL    Creatinine 0.70 0.55 - 1.02 MG/DL    Bun/Cre Ratio 17 12 - 20      Est, Glom Filt Rate >90 >60 ml/min/1.73m2    Calcium 8.6 8.5 - 10.1 MG/DL    Total Bilirubin 0.6 0.2 - 1.0 MG/DL    ALT 16 12 - 78 U/L    AST 7 (L) 15 - 37 U/L    Alk Phosphatase 98 45 - 117 U/L    Total Protein 6.7 6.4 - 8.2 g/dL    Albumin 3.4 (L) 3.5 - 5.0 g/dL    Globulin 3.3 2.0 - 4.0 g/dL    Albumin/Globulin Ratio 1.0 (L) 1.1 - 2.2     Troponin    Collection Time: 05/13/24  4:10 PM   Result Value Ref Range    Troponin, High Sensitivity <4 0 - 51 ng/L       Radiologic Studies -   XR CHEST PORTABLE   Final Result      No acute process on portable chest.           [unfilled]  [unfilled]      Medical Decision Making   I am the first provider for this patient.    I reviewed the vital signs, available nursing notes, past medical history, past

## 2024-05-13 NOTE — ED NOTES
Bedside shift change report given to Ade (oncoming nurse) by Opal (offgoing nurse). Report included the following information ED SBAR, Adult Overview, Recent Results, and Neuro Assessment.

## 2024-05-14 LAB
EKG ATRIAL RATE: 72 BPM
EKG DIAGNOSIS: NORMAL
EKG P AXIS: 40 DEGREES
EKG P-R INTERVAL: 132 MS
EKG Q-T INTERVAL: 432 MS
EKG QTC CALCULATION (BAZETT): 473 MS
EKG R AXIS: 22 DEGREES

## 2024-05-14 NOTE — ED NOTES
Patient discharged from the ED by Md Zenaida. Diagnosis, medications, precautions and follow-ups were reviewed with the patient/family. Questions were asked and answered prior to departure. Patient departed the ED via wheelchair and was accompanied by family to car.

## 2024-05-24 ENCOUNTER — OFFICE VISIT (OUTPATIENT)
Age: 39
End: 2024-05-24

## 2024-05-24 VITALS
SYSTOLIC BLOOD PRESSURE: 118 MMHG | HEIGHT: 60 IN | HEART RATE: 78 BPM | DIASTOLIC BLOOD PRESSURE: 70 MMHG | OXYGEN SATURATION: 99 % | BODY MASS INDEX: 35.73 KG/M2 | WEIGHT: 182 LBS

## 2024-05-24 DIAGNOSIS — R55 SYNCOPE AND COLLAPSE: Primary | ICD-10-CM

## 2024-05-24 PROCEDURE — 99204 OFFICE O/P NEW MOD 45 MIN: CPT | Performed by: INTERNAL MEDICINE

## 2024-05-24 RX ORDER — LEVOTHYROXINE SODIUM 0.05 MG/1
50 TABLET ORAL DAILY
COMMUNITY
Start: 2021-07-19 | End: 2024-05-24

## 2024-05-24 RX ORDER — LANOLIN ALCOHOL/MO/W.PET/CERES
0.4 CREAM (GRAM) TOPICAL
COMMUNITY
Start: 2016-06-02 | End: 2024-05-24

## 2024-05-24 ASSESSMENT — PATIENT HEALTH QUESTIONNAIRE - PHQ9
2. FEELING DOWN, DEPRESSED OR HOPELESS: NOT AT ALL
SUM OF ALL RESPONSES TO PHQ QUESTIONS 1-9: 0
1. LITTLE INTEREST OR PLEASURE IN DOING THINGS: NOT AT ALL
SUM OF ALL RESPONSES TO PHQ9 QUESTIONS 1 & 2: 0
SUM OF ALL RESPONSES TO PHQ QUESTIONS 1-9: 0

## 2024-05-24 NOTE — PATIENT INSTRUCTIONS
may need emergency care. For example, call if:    You have symptoms of a heart problem. These may include:  Chest pain or pressure.  Severe trouble breathing.  A fast or irregular heartbeat.   Watch closely for changes in your health, and be sure to contact your doctor if:    You have more episodes of fainting at home.     You do not get better as expected.   Where can you learn more?  Go to https://www.[employer].net/patientEd and enter L754 to learn more about \"Vasovagal Syncope: Care Instructions.\"  Current as of: August 6, 2023               Content Version: 14.0  © 2614-3315 [employer].   Care instructions adapted under license by [employer]. If you have questions about a medical condition or this instruction, always ask your healthcare professional. [employer] disclaims any warranty or liability for your use of this information.

## 2024-05-24 NOTE — PROGRESS NOTES
Dr. Keller recommended patient have a 14 day holter, echo, and routine stress test for dx syncope. Patient will call or send [employer] message once she figures things out with insurance and she is ready to schedule tests.   
visit.       I have reviewed the nurses notes, vitals, problem list, allergy list, medical history, family, social history and medications.       REVIEW OF SYMPTOMS      Please see detailed HPI above, pertinent ROS and negatives noted     PHYSICAL EXAMINATION      /70 (Site: Right Upper Arm, Position: Sitting, Cuff Size: Medium Adult)   Pulse 78   Ht 1.524 m (5')   Wt 82.6 kg (182 lb)   LMP 05/14/2024   SpO2 99%   BMI 35.54 kg/m²       Physical Exam  HENT:      Head: Normocephalic.      Nose: Nose normal.      Mouth/Throat:      Mouth: Mucous membranes are moist.   Eyes:      Conjunctiva/sclera: Conjunctivae normal.   Cardiovascular:      Rate and Rhythm: Normal rate and regular rhythm.      Pulses: Normal pulses.      Heart sounds: Normal heart sounds.   Pulmonary:      Effort: Pulmonary effort is normal.      Breath sounds: Normal breath sounds.   Musculoskeletal:         General: Normal range of motion.      Cervical back: Normal range of motion.   Skin:     General: Skin is warm.   Neurological:      General: No focal deficit present.      Mental Status: She is alert and oriented to person, place, and time.   Psychiatric:         Mood and Affect: Mood normal.               CARDIAC STUDIES           Encounter Date: 05/13/24   EKG 12 Lead   Result Value    Ventricular Rate 72    Atrial Rate 72    P-R Interval 132    QRS Duration 92    Q-T Interval 432    QTc Calculation (Bazett) 473    P Axis 40    R Axis 22    T Axis 28    Diagnosis      Normal sinus rhythm  Normal ECG  No previous ECGs available  Confirmed by Cindy Ashraf MD ([id number]) on 5/14/2024 3:50:08 PM               LABORATORY DATA        Lab Results   Component Value Date/Time    WBC 10.9 05/13/2024 04:10 PM    HGB 9.8 05/13/2024 04:10 PM    HCT 32.8 05/13/2024 04:10 PM     05/13/2024 04:10 PM    MCV 69.1 05/13/2024 04:10 PM        Lab Results   Component Value Date/Time     05/13/2024 04:10 PM    K 3.8 05/13/2024 04:10 PM

## 2024-08-26 ENCOUNTER — OFFICE VISIT (OUTPATIENT)
Age: 39
End: 2024-08-26

## 2024-08-26 ENCOUNTER — TELEPHONE (OUTPATIENT)
Age: 39
End: 2024-08-26

## 2024-08-26 VITALS
HEART RATE: 82 BPM | WEIGHT: 187 LBS | DIASTOLIC BLOOD PRESSURE: 70 MMHG | SYSTOLIC BLOOD PRESSURE: 120 MMHG | OXYGEN SATURATION: 99 % | RESPIRATION RATE: 16 BRPM | HEIGHT: 60 IN | BODY MASS INDEX: 36.71 KG/M2

## 2024-08-26 DIAGNOSIS — R07.9 CHEST PAIN, UNSPECIFIED TYPE: Primary | ICD-10-CM

## 2024-08-26 PROCEDURE — 99214 OFFICE O/P EST MOD 30 MIN: CPT | Performed by: INTERNAL MEDICINE

## 2024-08-26 RX ORDER — OXYBUTYNIN CHLORIDE 5 MG/1
5 TABLET, EXTENDED RELEASE ORAL DAILY
COMMUNITY
Start: 2024-08-16

## 2024-08-26 RX ORDER — LEVOTHYROXINE SODIUM 50 UG/1
50 TABLET ORAL EVERY MORNING
COMMUNITY
Start: 2024-07-09

## 2024-08-26 ASSESSMENT — PATIENT HEALTH QUESTIONNAIRE - PHQ9
1. LITTLE INTEREST OR PLEASURE IN DOING THINGS: NOT AT ALL
SUM OF ALL RESPONSES TO PHQ QUESTIONS 1-9: 0
SUM OF ALL RESPONSES TO PHQ9 QUESTIONS 1 & 2: 0
SUM OF ALL RESPONSES TO PHQ QUESTIONS 1-9: 0
2. FEELING DOWN, DEPRESSED OR HOPELESS: NOT AT ALL

## 2024-08-26 NOTE — PROGRESS NOTES
SOCIAL HISTORY     Social History     Socioeconomic History    Marital status:    Tobacco Use    Smoking status: Never    Smokeless tobacco: Never   Substance and Sexual Activity    Alcohol use: No     Alcohol/week: 0.0 standard drinks of alcohol    Drug use: No   Social History Narrative    Lives with  father in law     Social Determinants of Health     Financial Resource Strain: Low Risk  (12/16/2018)    [facility]  (prior to 6/17/2023)    Overall Financial Resource Strain (CARDIA)     Difficulty of Paying Living Expenses: Not hard at all   Food Insecurity: No Food Insecurity (12/16/2018)    Received from [facility] [place] [facility]  (prior to 6/17/2023)    Hunger Vital Sign     Worried About Running Out of Food in the Last Year: Never true     Ran Out of Food in the Last Year: Never true   Transportation Needs: No Transportation Needs (12/16/2018)    [facility]  (prior to 6/17/2023)    PRAPARE - Transportation     Lack of Transportation (Medical): No     Lack of Transportation (Non-Medical): No   Physical Activity: Inactive (12/16/2018)    Received from [facility] [place] [facility]  (prior to 6/17/2023)    Exercise Vital Sign     Days of Exercise per Week: 0 days     Minutes of Exercise per Session: 0 min   Stress: No Stress Concern Present (12/16/2018)    Received from [facility] [place] [facility]  (prior to 6/17/2023)    [employer] [place] [employer] - Occupational Stress Questionnaire     Feeling of Stress : Not at all   Social Connections: Socially Integrated (12/16/2018)    Received from [facility] [place] [facility]  (prior to 6/17/2023)    Social Connection and Isolation Panel [NHANES]     Frequency of Communication with Friends and Family: More than three times a week     Frequency of Social Gatherings with Friends and Family: More than three times a week     Attends [employer]: More than 4 times per year

## 2024-08-26 NOTE — TELEPHONE ENCOUNTER
Enrolled with [employer] - Ordered and being shipped to patient's home address on file.  ETA within 5-7 business days.         14 day holter  Received: Today  Gracie Johnson, Radha Dorantes, would you please order a 14 day for this patient? Thank you!

## 2024-08-26 NOTE — PATIENT INSTRUCTIONS
You will be scheduled for a stress test after your appointment today.  Please wear comfortable clothing (shorts or pants with a shirt or blouse) and walking/athletic shoes.  Do not eat or drink anything, except water, for at least 2 hours prior to your test.  Do take your scheduled medications prior to your test.

## 2024-12-23 ENCOUNTER — OFFICE VISIT (OUTPATIENT)
Age: 39
End: 2024-12-23

## 2024-12-23 VITALS
OXYGEN SATURATION: 100 % | HEART RATE: 81 BPM | SYSTOLIC BLOOD PRESSURE: 128 MMHG | BODY MASS INDEX: 36.44 KG/M2 | HEIGHT: 60 IN | DIASTOLIC BLOOD PRESSURE: 80 MMHG | WEIGHT: 185.6 LBS

## 2024-12-23 DIAGNOSIS — R55 SYNCOPE AND COLLAPSE: Primary | ICD-10-CM

## 2024-12-23 DIAGNOSIS — E03.9 ACQUIRED HYPOTHYROIDISM: ICD-10-CM

## 2024-12-23 LAB
T4 FREE SERPL-MCNC: 1 NG/DL (ref 0.8–1.5)
TSH SERPL DL<=0.05 MIU/L-ACNC: 6.5 UIU/ML (ref 0.36–3.74)

## 2024-12-23 PROCEDURE — 99214 OFFICE O/P EST MOD 30 MIN: CPT | Performed by: NURSE PRACTITIONER

## 2024-12-23 ASSESSMENT — PATIENT HEALTH QUESTIONNAIRE - PHQ9
SUM OF ALL RESPONSES TO PHQ9 QUESTIONS 1 & 2: 0
SUM OF ALL RESPONSES TO PHQ QUESTIONS 1-9: 0
2. FEELING DOWN, DEPRESSED OR HOPELESS: NOT AT ALL
SUM OF ALL RESPONSES TO PHQ QUESTIONS 1-9: 0
1. LITTLE INTEREST OR PLEASURE IN DOING THINGS: NOT AT ALL

## 2024-12-23 NOTE — ASSESSMENT & PLAN NOTE
-pt reports intermittent episodes of arm tingling, flushed feeling, diaphoresis and then feels like she is going to pass out. Has long history of syncope without clear cause identified. Evaluated here with holter monitor and exercise stress test which was negative for ischemia. Holter monitor showed NSR with rare PVC.   -discussed staying hydrated, advised her to set alarm on phone for every 2 hours to remind her to drink water as she reports she forgets regularly

## 2024-12-23 NOTE — PROGRESS NOTES
Patient: Meenu Iverson  : 1985    Primary Cardiologist:Dr. Sukhdev Keller  EP Cardiologist:NONE   PCP: Efren Ratliff MD    Today's Date: 2024      ASSESSMENT AND PLAN:     Assessment & Plan  Syncope and collapse   -pt reports intermittent episodes of arm tingling, flushed feeling, diaphoresis and then feels like she is going to pass out. Has long history of syncope without clear cause identified. Evaluated here with holter monitor and exercise stress test which was negative for ischemia. Holter monitor showed NSR with rare PVC.   -discussed staying hydrated, advised her to set alarm on phone for every 2 hours to remind her to drink water and stay hydrated, recommended she drink 64 oz of water daily   Acquired hypothyroidism  -due to financial concerns she is unable to follow with an endocrinologist at this time and is not currently taking medication for her hypothyroidism  -labs given for TSH, free t4  -advised her to follow-up with primary care to start medication after labs drawn        Follow up with Dr. Sukhdev Keller in 6 months       HISTORY OF PRESENT ILLNESS:     History of Present Illness:  Meenu Iverson is a 39 y.o. female followed for syncope and concern for palpitations.      Today...  Pt presents for follow-up to discuss exercise stress test and holter monitor both of which were normal. She has not had any more episodes of syncope, but does report random episodes that include feeling sweaty, flushed and dizzy.    2024: TSH 6.5, T4 1.8    Denies chest pain, edema, syncope, shortness of breath at rest, dyspnea on exertion, PND or orthopnea.  Has no tachycardia, palpitations or sense of arrhythmia.     The ASCVD Risk score ([place] DK, et al., 2019) failed to calculate for the following reasons:    The 2019 ASCVD risk score is only valid for ages 40 to 79     PAST MEDICAL HISTORY:     Past Medical History:   Diagnosis Date    Hypothyroid 2019    Thyroid activity

## 2024-12-23 NOTE — PATIENT INSTRUCTIONS
-please get thyroid labs drawn today on the first floor and follow-up with PCP regarding restarting medication  -set alarm on phone for every 2 hours to remind you to drink a glass of water to stay hydrated - you should drink at least 64 oz of water daily

## 2024-12-23 NOTE — PROGRESS NOTES
1. Have you been to the ER, urgent care clinic since your last visit?  Hospitalized since your last visit?No    2. Have you seen or consulted any other health care providers outside of the [facility] since your last visit?  Include any pap smears or colon screening.  [facility] [place]

## 2024-12-23 NOTE — ASSESSMENT & PLAN NOTE
-due to financial concerns she is unable to follow with a endocrine doctor at this time and is not currently taking medication for her hypothyroidism  -labs given for TSH, free t4  -advised her to follow-up with primary care to start medication